# Patient Record
Sex: FEMALE | Race: WHITE | NOT HISPANIC OR LATINO | Employment: UNEMPLOYED | URBAN - METROPOLITAN AREA
[De-identification: names, ages, dates, MRNs, and addresses within clinical notes are randomized per-mention and may not be internally consistent; named-entity substitution may affect disease eponyms.]

---

## 2020-02-12 ENCOUNTER — HOSPITAL ENCOUNTER (EMERGENCY)
Facility: HOSPITAL | Age: 55
Discharge: HOME | End: 2020-02-12
Attending: EMERGENCY MEDICINE
Payer: COMMERCIAL

## 2020-02-12 VITALS
TEMPERATURE: 99 F | SYSTOLIC BLOOD PRESSURE: 111 MMHG | HEIGHT: 68 IN | WEIGHT: 185 LBS | OXYGEN SATURATION: 98 % | HEART RATE: 61 BPM | DIASTOLIC BLOOD PRESSURE: 71 MMHG | RESPIRATION RATE: 18 BRPM | BODY MASS INDEX: 28.04 KG/M2

## 2020-02-12 DIAGNOSIS — S46.912A MUSCLE STRAIN OF LEFT UPPER ARM, INITIAL ENCOUNTER: ICD-10-CM

## 2020-02-12 DIAGNOSIS — L02.91 ABSCESS: Primary | ICD-10-CM

## 2020-02-12 PROCEDURE — 96372 THER/PROPH/DIAG INJ SC/IM: CPT | Mod: ER

## 2020-02-12 PROCEDURE — 99284 EMERGENCY DEPT VISIT MOD MDM: CPT | Mod: 25,ER

## 2020-02-12 PROCEDURE — 63600175 PHARM REV CODE 636 W HCPCS: Mod: ER | Performed by: PHYSICIAN ASSISTANT

## 2020-02-12 RX ORDER — ZOLPIDEM TARTRATE 5 MG/1
5 TABLET ORAL NIGHTLY PRN
COMMUNITY

## 2020-02-12 RX ORDER — KETOROLAC TROMETHAMINE 30 MG/ML
30 INJECTION, SOLUTION INTRAMUSCULAR; INTRAVENOUS
Status: COMPLETED | OUTPATIENT
Start: 2020-02-12 | End: 2020-02-12

## 2020-02-12 RX ORDER — SULFAMETHOXAZOLE AND TRIMETHOPRIM 800; 160 MG/1; MG/1
1 TABLET ORAL 2 TIMES DAILY
Qty: 14 TABLET | Refills: 0 | Status: SHIPPED | OUTPATIENT
Start: 2020-02-12 | End: 2020-02-19

## 2020-02-12 RX ORDER — NAPROXEN 500 MG/1
500 TABLET ORAL 2 TIMES DAILY WITH MEALS
Qty: 12 TABLET | Refills: 0 | Status: SHIPPED | OUTPATIENT
Start: 2020-02-12

## 2020-02-12 RX ADMIN — KETOROLAC TROMETHAMINE 30 MG: 30 INJECTION, SOLUTION INTRAMUSCULAR at 09:02

## 2020-02-12 NOTE — ED TRIAGE NOTES
"Pt states 'I have a boil and it's draining blood, yellow and pussie looking stuff." Pt c/o abscess to groin. Denies fever. Pt used Priad.  "

## 2020-02-12 NOTE — ED PROVIDER NOTES
"Encounter Date: 2/12/2020       History     Chief Complaint   Patient presents with    Abscess     Pt states 'I have a boil and it's draining blood, yellow and pussie looking stuff." Pt c/o abscess to groin. Denies fever. Pt used Priad.    Arm Pain     Pt states "I have been having a hurting in my left arm." Denies injuries, chest pain or SOB. States bicep hurting.      54-year-old female presents to the emergency department for evaluation of 3 day history of abscess to her groin and one-week history of left shoulder/arm pain. She reports noticing a small red bump to her groin 3 days ago which increased in size and redness. She reports that yesterday opened and began draining pus and a small amount of blood.  She reports that she has been using an over-the-counter salve for symptom control.  She denies any fever, generalized body aches, generalized rash, nausea, abdominal pain, vomiting or diarrhea.  She reports that she has had one-week history of intermittent left shoulder and biceps pain.  She reports that it is an achy, throbbing pain worse with movement and palpation.  She denies any numbness, tingling, weakness or swelling to the upper extremities.  She denies any direct trauma. She denies any chest pain, palpitations, shortness of breath, headache, neck pain, dizziness or sweating.  She has been attempting treatment at home with Advil with mild relief of symptoms.        Review of patient's allergies indicates:   Allergen Reactions    Pcn [penicillins]      Past Medical History:   Diagnosis Date    Anxiety     Hypertension      Past Surgical History:   Procedure Laterality Date    TUBAL LIGATION       History reviewed. No pertinent family history.  Social History     Tobacco Use    Smoking status: Current Every Day Smoker     Types: Vaping with nicotine   Substance Use Topics    Alcohol use: Yes     Frequency: Never     Comment: occ    Drug use: Never     Review of Systems   Constitutional: Negative " for activity change, appetite change and fever.   HENT: Negative for congestion, rhinorrhea, sore throat, trouble swallowing and voice change.    Eyes: Negative for photophobia and visual disturbance.   Respiratory: Negative for cough, chest tightness, shortness of breath and wheezing.    Cardiovascular: Negative for chest pain.   Gastrointestinal: Negative for abdominal pain, diarrhea, nausea and vomiting.   Genitourinary: Negative for decreased urine volume, dysuria and vaginal pain.   Musculoskeletal: Positive for arthralgias. Negative for back pain, joint swelling and neck pain.   Skin: Positive for wound.   Neurological: Negative for dizziness, syncope, weakness, light-headedness, numbness and headaches.       Physical Exam     Initial Vitals [02/12/20 0854]   BP Pulse Resp Temp SpO2   134/77 72 18 98.6 °F (37 °C) 97 %      MAP       --         Physical Exam    Nursing note and vitals reviewed.  Constitutional: She appears well-developed and well-nourished. She is not diaphoretic. No distress.   HENT:   Head: Normocephalic and atraumatic.   Right Ear: External ear normal.   Left Ear: External ear normal.   Nose: Nose normal.   Mouth/Throat: Oropharynx is clear and moist.   Eyes: Conjunctivae and EOM are normal. Pupils are equal, round, and reactive to light.   Neck: Normal range of motion. Neck supple.   Cardiovascular: Normal rate, regular rhythm and normal heart sounds.   Pulmonary/Chest: Breath sounds normal. No respiratory distress. She has no wheezes. She has no rhonchi. She has no rales. She exhibits no tenderness.   Abdominal: Soft. There is no tenderness.   Musculoskeletal:        Left shoulder: She exhibits normal range of motion, no tenderness, no bony tenderness and no swelling.        Left elbow: She exhibits normal range of motion. No tenderness found.        Left wrist: She exhibits normal range of motion, no tenderness and no bony tenderness.        Left upper arm: She exhibits tenderness. She  exhibits no bony tenderness, no swelling and no edema.        Left forearm: She exhibits no tenderness and no bony tenderness.        Left hand: She exhibits normal range of motion, no tenderness and no bony tenderness. Normal sensation noted. Normal strength noted.   Lymphadenopathy:     She has no cervical adenopathy.   Neurological: She is alert and oriented to person, place, and time.   Skin: Skin is warm and dry. Abscess noted.        Psychiatric: She has a normal mood and affect.         ED Course   Procedures  Labs Reviewed - No data to display       Imaging Results    None          Medical Decision Making:   Initial Assessment:   44-year-old female presents to the emergency department for evaluation of open and draining abscess as well as one-week history of and left arm muscular pain.  Physical exam reveals a nontoxic-appearing female in no acute distress. Patient is afebrile vital signs within normal limits.  Neurological exam reveals an alert and oriented patient.  Lungs clear to auscultation bilaterally.  Neck is supple, no meningeal signs noted.  Examination of the left upper extremity reveals tenderness to palpation noted over the biceps and mild tenderness to palpation noted over the deltoid.  No erythema, edema or ecchymosis noted. No bony instability or crepitus noted. Full range of motion, sensation and peripheral pulses intact in upper extremities bilaterally.  Differential Diagnosis:   Muscle strain  Discussed possibility of an x-ray, patient declined at this time stating that is not joint pain.  I carefully considered but doubt serious etiology including ACS.  Abscess    ED Management:  No indication for incision and drainage as the abscess is opened.  Wound was cleansed and bandaged in the emergency department.  Will cover the patient with Bactrim upon discharge. Instructed patient to follow up with her primary care provider for re-evaluation and to return to the emergency department  immediately for any new or worsening symptoms.                                 Clinical Impression:       ICD-10-CM ICD-9-CM   1. Abscess L02.91 682.9   2. Muscle strain of left upper arm, initial encounter S46.912A 840.9                             Gabriela Meneses PA-C  02/12/20 0942

## 2020-02-12 NOTE — DISCHARGE INSTRUCTIONS
You are instructed to follow up with your primary care provider for re-evaluation within 3 days.  Your instructed to returnTo the emergency department immediately for any new or worsening symptoms.